# Patient Record
Sex: FEMALE | Race: WHITE
[De-identification: names, ages, dates, MRNs, and addresses within clinical notes are randomized per-mention and may not be internally consistent; named-entity substitution may affect disease eponyms.]

---

## 2021-04-21 ENCOUNTER — HOSPITAL ENCOUNTER (OUTPATIENT)
Dept: HOSPITAL 84 - D.ER | Age: 31
Discharge: HOME | End: 2021-04-21
Attending: OBSTETRICS & GYNECOLOGY
Payer: COMMERCIAL

## 2021-04-21 VITALS
BODY MASS INDEX: 27.37 KG/M2 | HEIGHT: 64 IN | WEIGHT: 160.34 LBS | WEIGHT: 160.34 LBS | BODY MASS INDEX: 27.37 KG/M2 | HEIGHT: 64 IN

## 2021-04-21 VITALS — SYSTOLIC BLOOD PRESSURE: 103 MMHG | DIASTOLIC BLOOD PRESSURE: 61 MMHG

## 2021-04-21 VITALS — SYSTOLIC BLOOD PRESSURE: 96 MMHG | DIASTOLIC BLOOD PRESSURE: 60 MMHG

## 2021-04-21 DIAGNOSIS — O03.4: Primary | ICD-10-CM

## 2021-04-21 LAB
ALBUMIN SERPL-MCNC: 4 G/DL (ref 3.4–5)
ALP SERPL-CCNC: 70 U/L (ref 30–120)
ALT SERPL-CCNC: 18 U/L (ref 10–68)
ANION GAP SERPL CALC-SCNC: 15 MMOL/L (ref 8–16)
BACTERIA #/AREA URNS HPF: (no result) HPF
BASOPHILS NFR BLD AUTO: 0 % (ref 0–2)
BASOPHILS NFR BLD AUTO: 0.1 % (ref 0–2)
BILIRUB SERPL-MCNC: 0.35 MG/DL (ref 0.2–1.3)
BILIRUB SERPL-MCNC: NEGATIVE MG/DL
BUN SERPL-MCNC: 14 MG/DL (ref 7–18)
CALCIUM SERPL-MCNC: 9.4 MG/DL (ref 8.5–10.1)
CHLORIDE SERPL-SCNC: 101 MMOL/L (ref 98–107)
CO2 SERPL-SCNC: 23.6 MMOL/L (ref 21–32)
CREAT SERPL-MCNC: 0.9 MG/DL (ref 0.6–1.3)
EOSINOPHIL NFR BLD: 0 % (ref 0–7)
EOSINOPHIL NFR BLD: 0 % (ref 0–7)
ERYTHROCYTE [DISTWIDTH] IN BLOOD BY AUTOMATED COUNT: 13.6 % (ref 11.5–14.5)
ERYTHROCYTE [DISTWIDTH] IN BLOOD BY AUTOMATED COUNT: 13.7 % (ref 11.5–14.5)
GLOBULIN SER-MCNC: 3.6 G/L
GLUCOSE SERPL-MCNC: 123 MG/DL (ref 74–106)
HCG SERPL-ACNC: 23 MIU/ML
HCG SERPL-ACNC: POSITIVE M[IU]/ML
HCT VFR BLD CALC: 27 % (ref 36–48)
HCT VFR BLD CALC: 29.4 % (ref 36–48)
HGB BLD-MCNC: 8.5 G/DL (ref 12–16)
HGB BLD-MCNC: 9 G/DL (ref 12–16)
IMM GRANULOCYTES NFR BLD: 0 % (ref 0–5)
IMM GRANULOCYTES NFR BLD: 0.1 % (ref 0–5)
KETONES UR STRIP-MCNC: (no result) MG/DL
LYMPHOCYTES # BLD: 0.6 10X3/UL (ref 1.18–3.74)
LYMPHOCYTES # BLD: 1.89 10X3/UL (ref 1.18–3.74)
LYMPHOCYTES NFR BLD AUTO: 13.6 % (ref 15–50)
LYMPHOCYTES NFR BLD AUTO: 9.3 % (ref 15–50)
MCH RBC QN AUTO: 24.6 PG (ref 26–34)
MCH RBC QN AUTO: 25.3 PG (ref 26–34)
MCHC RBC AUTO-ENTMCNC: 30.6 G/DL (ref 31–37)
MCHC RBC AUTO-ENTMCNC: 31.5 G/DL (ref 31–37)
MCV RBC: 80.3 FL (ref 80–100)
MCV RBC: 80.4 FL (ref 80–100)
MONOCYTES NFR BLD: 0.3 % (ref 2–11)
MONOCYTES NFR BLD: 5.5 % (ref 2–11)
NEUTROPHILS # BLD: 11.23 10X3/UL (ref 1.56–6.13)
NEUTROPHILS # BLD: 5.85 10X3/UL (ref 1.56–6.13)
NEUTROPHILS NFR BLD AUTO: 80.7 % (ref 40–80)
NEUTROPHILS NFR BLD AUTO: 90.4 % (ref 40–80)
NITRITE UR-MCNC: NEGATIVE MG/ML
OSMOLALITY SERPL CALC.SUM OF ELEC: 273 MOSM/KG (ref 275–300)
PH UR STRIP: 7 [PH] (ref 5–8)
PLATELET # BLD: 293 10X3/UL (ref 130–400)
PLATELET # BLD: 377 10X3/UL (ref 130–400)
PMV BLD AUTO: 8.5 FL (ref 7.4–10.4)
PMV BLD AUTO: 8.9 FL (ref 7.4–10.4)
POTASSIUM SERPL-SCNC: 3.6 MMOL/L (ref 3.5–5.1)
PROT SERPL-MCNC: 7.6 G/DL (ref 6.4–8.2)
RBC # BLD AUTO: 3.36 10X6/UL (ref 4–5.4)
RBC # BLD AUTO: 3.66 10X6/UL (ref 4–5.4)
SODIUM SERPL-SCNC: 136 MMOL/L (ref 136–145)
SQUAMOUS #/AREA URNS HPF: (no result) HPF (ref 0–4)
UROBILINOGEN UR-MCNC: 1 MG/DL (ref ?–2)
WBC # BLD AUTO: 13.9 10X3/UL (ref 4.8–10.8)
WBC # BLD AUTO: 6.5 10X3/UL (ref 4.8–10.8)
WBC #/AREA URNS HPF: (no result) HPF (ref 0–4)

## 2021-04-21 NOTE — NUR
DISCHARGE INSTRUCTIONS PROVIDED TO PATIENT AT THIS TIME. PT VERBALIZES
UNDERSTANDING AND WILL CALL THE CLINIC TOMORROW TO MAKE A FOLLOW UP APPT FOR
FRIDAY. IV D/C'D WITHOUT COMPLICATIONS.

## 2021-04-21 NOTE — NUR
TRANSFUSION COMPLETED AND FLUSH STARTED.  PT RATES PAIN AT 0/10.  VERIFIED
WITH DR JENKINS ABOUT DIET, DIETARY CALLED FOR REGULAR DIET TRAY.

## 2021-04-21 NOTE — NUR
RECEIVED BY BED FROM RECOVERY ROOM,  PT IS AWAKE AND ALERT, RATES PAIN AT
0/10. VSS AS CHARTED TO FLOWSHEET.  IV TO L FOREARM INFUSING NS AT 125ML/HR
VIA PUMP.  DENIES NAUSEA AND REQUEST DR PEPPER TO DRINK. SIDE RAILS UP X 2
WITH CALL LIGHT IN REACH.

## 2021-04-21 NOTE — NUR
PT DENIES PAIN OR DISCOMFORT. VSS.  TRANSFUSION RATE INCREASED TO 200ML/HR.
CALL LIGHT IN REACH WITH SIDE RAILS UP X 2.

## 2021-04-21 NOTE — OP
PATIENT NAME:  CALEB LITTLE                              MEDICAL RECORD: [id number]
:89                                             LOCATION:[facility]          
                                                         ADMISSION DATE:        
SURGEON:  KIM JENKINS MD        
 
 
DATE OF OPERATION:  2021
 
PREOPERATIVE DIAGNOSIS:   in progress/incomplete .
 
POSTOPERATIVE DIAGNOSIS:   in progress/incomplete .
 
PROCEDURE:  Suction dilation and curettage.
 
ANESTHESIA:  General.
 
IV FLUID:  Per anesthesia record.
 
SPECIMENS:  Included endometrial curettings/products of conception.
 
FINDINGS:
1. Grossly normal-appearing external genitalia and open cervix.
2. Obvious return of products of conception on suction and C.
 
COMPLICATIONS:  None apparent.
 
ESTIMATED BLOOD LOSS:  100 cc.
 
DESCRIPTION OF PROCEDURE:  The patient was taken to the operating room where
general anesthesia was achieved without difficulty.  The patient was then
prepped and draped in normal sterile fashion in the dorsal lithotomy position in
the [facility].  At this point, the patient was prepped and draped and
the bladder was drained of approximately 50 cc of clear yellow urine.  At this
point, a speculum was placed into the vagina and the cervix was grasped on its
anterior lip with a single tooth tenaculum.  The patient was found to be
significantly dilated and the patient was further dilated to approximately 9 mm.
 At this point, an 8 mm straight suction curette was placed into the uterus
without difficulty and visual return of products of conception was noted.  Three
passes were performed and then #1 curette was used to gently feel and for any
further products of conception, gentle curettage was performed with return of
several small pieces of products of conception.  A last pass was performed with
the #8 suction curette with minimal return of blood or products of conception. 
Hemostasis was noted to be well controlled and a single tooth tenaculum was
removed.  No bleeding from the tenaculum site was noted.  Speculum was removed
and the patient was transferred to postanesthesia recovery stable without
incident.
 
TRANSINT:[id number] Voice Confirmation ID: [id number] DOCUMENT ID: [id number]
                                           
                                           KIM JENKINS MD        
CC:                                                             [id number]
DICTATION DATE: 21     :     21 0734      [facility] 
                                                                      21
[id number] [place] [address]

## 2021-04-21 NOTE — NUR
PT TALKING WITH FRIEND AT BEDSIDE. DENIES PAIN OR DISCOMFORT. VSS. LARGE CUP
OF ICE WATER PROVIDED AS REQUESTED. TRANSFUSION CONTINUE TO INFUSE VIA PUMP.
CALL LIGHT IN REACH.

## 2021-04-21 NOTE — NUR
1ST UNIT OF PRBC VERIFIED AT BEDSIDE WITH JERRY AWAD RN. STARTED AT 75ML/HR FOR
INITIAL 50CC,  THIS RN REMAINS AT BEDSIDE.

## 2021-04-21 NOTE — NUR
PT UP TO VOID WITH LITTLE ASSISTANCE NEEDED.  LARGE CUP OF ICE WITH DR MICHAEL
PROVIDED AS REQUESTED.   AT BEDSIDE FOR BLOOD DRAW.

## 2021-04-21 NOTE — NUR
CBC RESULTS REPORTED TO DR JENKINS AT THIS TIME. NEW ORDERS NOTED TO
DISCHARGE PT TO HOME TO FOLLOW UP WITH THE CLINIC ON FRIDAY.  PT MAY TAKE
MOTRIN AS DIRECTED ON THE BOTTLE FOR PAIN.

## 2021-07-01 ENCOUNTER — HOSPITAL ENCOUNTER (OUTPATIENT)
Dept: HOSPITAL 84 - D.LABREF | Age: 31
Discharge: HOME | End: 2021-07-01
Attending: OBSTETRICS & GYNECOLOGY
Payer: COMMERCIAL

## 2021-07-01 VITALS — BODY MASS INDEX: 27.5 KG/M2

## 2021-07-01 DIAGNOSIS — N92.0: Primary | ICD-10-CM

## 2021-07-01 LAB
BASOPHILS NFR BLD AUTO: 0.5 % (ref 0–2)
EOSINOPHIL NFR BLD: 1.3 % (ref 0–7)
ERYTHROCYTE [DISTWIDTH] IN BLOOD BY AUTOMATED COUNT: 18.4 % (ref 11.5–14.5)
HCT VFR BLD CALC: 28.3 % (ref 36–48)
HGB BLD-MCNC: 8.6 G/DL (ref 12–16)
LYMPHOCYTES # BLD: 2.4 10X3/UL (ref 1.18–3.74)
LYMPHOCYTES NFR BLD AUTO: 26.2 % (ref 15–50)
MCH RBC QN AUTO: 19.4 PG (ref 26–34)
MCHC RBC AUTO-ENTMCNC: 30.6 G/DL (ref 31–37)
MCV RBC: 63.4 FL (ref 80–100)
MONOCYTES NFR BLD: 5.8 % (ref 2–11)
NEUTROPHILS # BLD: 6 10X3/UL (ref 1.56–6.13)
NEUTROPHILS NFR BLD AUTO: 66.2 % (ref 40–80)
PLATELET # BLD: 361 10X3/UL (ref 130–400)
PMV BLD AUTO: 7.6 FL (ref 7.4–10.4)
RBC # BLD AUTO: 4.46 10X6/UL (ref 4–5.4)
WBC # BLD AUTO: 9.1 10X3/UL (ref 4.8–10.8)

## 2021-07-02 ENCOUNTER — HOSPITAL ENCOUNTER (OUTPATIENT)
Dept: HOSPITAL 84 - D.ER | Age: 31
Setting detail: OBSERVATION
LOS: 1 days | Discharge: HOME | End: 2021-07-03
Attending: OBSTETRICS & GYNECOLOGY | Admitting: OBSTETRICS & GYNECOLOGY
Payer: COMMERCIAL

## 2021-07-02 VITALS — DIASTOLIC BLOOD PRESSURE: 55 MMHG | SYSTOLIC BLOOD PRESSURE: 93 MMHG

## 2021-07-02 VITALS — DIASTOLIC BLOOD PRESSURE: 55 MMHG | SYSTOLIC BLOOD PRESSURE: 110 MMHG

## 2021-07-02 VITALS
WEIGHT: 148.31 LBS | BODY MASS INDEX: 25.32 KG/M2 | WEIGHT: 148.31 LBS | HEIGHT: 64 IN | BODY MASS INDEX: 25.32 KG/M2 | HEIGHT: 64 IN

## 2021-07-02 VITALS — SYSTOLIC BLOOD PRESSURE: 99 MMHG | DIASTOLIC BLOOD PRESSURE: 58 MMHG

## 2021-07-02 VITALS — SYSTOLIC BLOOD PRESSURE: 96 MMHG | DIASTOLIC BLOOD PRESSURE: 57 MMHG

## 2021-07-02 VITALS — DIASTOLIC BLOOD PRESSURE: 64 MMHG | SYSTOLIC BLOOD PRESSURE: 111 MMHG

## 2021-07-02 VITALS — DIASTOLIC BLOOD PRESSURE: 56 MMHG | SYSTOLIC BLOOD PRESSURE: 103 MMHG

## 2021-07-02 VITALS — SYSTOLIC BLOOD PRESSURE: 95 MMHG | DIASTOLIC BLOOD PRESSURE: 55 MMHG

## 2021-07-02 VITALS — DIASTOLIC BLOOD PRESSURE: 58 MMHG | SYSTOLIC BLOOD PRESSURE: 100 MMHG

## 2021-07-02 DIAGNOSIS — D64.9: ICD-10-CM

## 2021-07-02 DIAGNOSIS — N92.0: Primary | ICD-10-CM

## 2021-07-02 LAB
ANION GAP SERPL CALC-SCNC: 10.8 MMOL/L (ref 8–16)
APTT BLD: 25.1 SECONDS (ref 22.8–39.4)
BASOPHILS NFR BLD AUTO: 0.7 % (ref 0–2)
BUN SERPL-MCNC: 15 MG/DL (ref 7–18)
CALCIUM SERPL-MCNC: 8.6 MG/DL (ref 8.5–10.1)
CHLORIDE SERPL-SCNC: 104 MMOL/L (ref 98–107)
CO2 SERPL-SCNC: 27 MMOL/L (ref 21–32)
CREAT SERPL-MCNC: 0.7 MG/DL (ref 0.6–1.3)
EOSINOPHIL NFR BLD: 2.2 % (ref 0–7)
ERYTHROCYTE [DISTWIDTH] IN BLOOD BY AUTOMATED COUNT: 18.4 % (ref 11.5–14.5)
GLUCOSE SERPL-MCNC: 87 MG/DL (ref 74–106)
HCT VFR BLD CALC: 28.1 % (ref 36–48)
HGB BLD-MCNC: 8.7 G/DL (ref 12–16)
INR PPP: 1.05 (ref 0.85–1.17)
LYMPHOCYTES # BLD: 2.5 10X3/UL (ref 1.18–3.74)
LYMPHOCYTES NFR BLD AUTO: 33 % (ref 15–50)
MCH RBC QN AUTO: 19.4 PG (ref 26–34)
MCHC RBC AUTO-ENTMCNC: 30.7 G/DL (ref 31–37)
MCV RBC: 63.1 FL (ref 80–100)
MONOCYTES NFR BLD: 6 % (ref 2–11)
NEUTROPHILS # BLD: 4.4 10X3/UL (ref 1.56–6.13)
NEUTROPHILS NFR BLD AUTO: 58.1 % (ref 40–80)
OSMOLALITY SERPL CALC.SUM OF ELEC: 275 MOSM/KG (ref 275–300)
PLATELET # BLD: 365 10X3/UL (ref 130–400)
PMV BLD AUTO: 7.2 FL (ref 7.4–10.4)
POTASSIUM SERPL-SCNC: 3.8 MMOL/L (ref 3.5–5.1)
PROTHROMBIN TIME: 12.7 SECONDS (ref 11.6–15)
RBC # BLD AUTO: 4.46 10X6/UL (ref 4–5.4)
SODIUM SERPL-SCNC: 138 MMOL/L (ref 136–145)
WBC # BLD AUTO: 7.5 10X3/UL (ref 4.8–10.8)

## 2021-07-02 NOTE — NUR
LARGE CUP OF ICE WATER AND SMALL CUP ICE AS REQUESTED. EXPLAINED TO PT THAT MD
WOULD NOT BE BACK ON UNIT UNTIL LATER TONIGHT AND IF SHE WISHES TO STAY THAT
IS NOT A PROBLEM OR SHE CAN DISCHARGE HOME, AND WILL HAVE FOLLOW UP AT CLINIC
IN 1-2 WEEKS. TALKING WITH HER FRIEND/FAMILY MEMBER AND WILL NOTIFY NURSE.

## 2021-07-02 NOTE — NUR
1 UNIT OF PRBC'S COMPLETED.  CLAMPED IV TUBING TO BLOOD AND OPENED NS SIDE.
NS INFUSING  ML/HR  ML BOLUS TO CLEAR IV TUBING LINE.  VITALS
TAKEN.  SEE GRAPH.

## 2021-07-02 NOTE — NUR
PT RECEIVED BY WHEELCHAIR FROM ER. TO BATHROOM WITHOUT COMPLAINT OF DIZZINESS
OR NAUSEA.  DR JENKINS PAGED AS REQUESTED.

## 2021-07-02 NOTE — NUR
SURGERY, ANESTHESIA AND BLOOD CONSENTS SIGNED AND WITNESSED.  PT DENIES ANY
ALLERGIES AND STATES "I TAKE BIRTH CONTROL PILL AT NIGHT" RATES PAIN AT 2/10
AT THIS TIME, BUT APPEARS TEARFUL, FRIEND REMAINS AT BEDSIDE.

## 2021-07-02 NOTE — NUR
DR JENKINS CALLED FOR ORDERS.  REPORT THAT PT DENIES PAIN AND NO NAUSEA, SHE
HAS EATEN REGULAR DIET BUT HAS NOT VOIDED.  MAY D/C HOME OR STAY AND SPEAK TO
MD GERALDINE LANDERS. MD ASK THAT HE BE NOTIFIED OF HER DECISION.

## 2021-07-02 NOTE — NUR
PT. MEDICATED WITH NORCO 10/325 MG 1 TAB AND MOTRIN 600 MG 1 TAB FOR
"CRAMPING" THAT PT. RATES AT "2" ON 0-10 SCALE.  NO VAGINAL BLEEDING NOTED AT
THIS TIME.  INSTRUCTED PT. TO NOT GET UP TO BATHROOM WITHOUT MY ASSISTANCE DUE
TO HER BP BEING LOW.  PT. VERBALIZES UNDERSTANDING.  PT. CONTINUES TO DENY ANY
SIGNS OF SYMPTOMS OF BLOOD TRANSFUSION REACTION.  UPON PT. ROLLING AROUND IN
BED TO GET COMFORTABLE, PT.'S PULSE GOES UP TO 68 BUT SLOWS AS SOON AS SHE
GETS STILL.  WILL CONT. TO MONITOR.

## 2021-07-02 NOTE — NUR
PT. CONTINUES TO DENY ANY TYPE OF REACTION TO BLOOD TRANSFUSION.  INCREASED
1ST UNIT PRBC'S  ML/HR.  WILL CONT. TO MONITOR.

## 2021-07-02 NOTE — NUR
DR. JENKINS CALLS UNIT.  REPORT GIVEN OF PT.'S REQUEST OF FOR "SOMETHING TO
HELP HER SLEEP".  ORDERS REC.

## 2021-07-02 NOTE — NUR
PT. CONTINUES TO DENY ANY SIGNS OR SYMPTOMS OF BLOOD TRANSFUSION REACTION.
2ND UNIT INCREASED  ML/HR.  INSTRUCTED PT. TO CALL FOR ANY ISSUES.  CALL
LIGHT WITHIN REACH.  SIDE RAILS UP X 2.  WILL CONT. TO MONITOR.

## 2021-07-02 NOTE — NUR
1ST UNIT OF PRBC'S PICKED UP BY MELYSSA APPLE RN.  TO PT.'S BEDSIDE.  DISCUSSED
SIGNS AND SYMPTOMS OF ALLERGIC REACTION TO BLOOD TRANSFUSION.  MELYSSA APPLE RN AND
LETI AGUILAR RN PERFORM VERIFICATION OF BLOOD PRIOR TO STARTING INFUSION.  ALL ARE
MATCHING.  PT. DENIES ALLERGIES OF ANY KIND.  THIS RN REMAINS AT BEDSIDE FOR
THE FIRST 5 MINUTES OF INFUSION AND PT. DENIES ANY REACTION AT THIS TIME.
CALL LIGHT IN REACH.  INSTRUCTED PT. TO CALL ME IMMEDIATELY IF SHE STARTS
FEELING ANYTHING AND THAT I WILL BE BACK IN 15 MINUTES TO GET ANOTHER SET OF
VITALS.

## 2021-07-02 NOTE — NUR
MELYSSA APPLE RN AND LEIT AGUILAR RN VERIFY 2ND UNIT OF PRBC'S AT PT.'S BEDSIDE.  20 G.
IV CATH IN RIGHT HAND INFUSING WITHOUT DIFFICULTY AND WITH NO PAIN PER PT.

## 2021-07-02 NOTE — NUR
AMBIEN 5 MG (1) TABLET ADMINISTERED PER MD ORDERS AND PT.'S REQUEST FOR
SOMETHING TO HELP HER SLEEP.

## 2021-07-02 NOTE — NUR
RECEIVED TO ROOM BY BED FROM RECOVERY. SHE IS AWAKE AND ALERT, RATES PAIN AT
0/10 STATES "I'M JUST HUNGRY" DR JENKINS SPOKE WITH HER PRIOR TO SURGERY AND
SHE WAS TOLD OK TO EAT WHEN SHE GOT BACK TO HER ROOM, DENIES NAUSEA AT THIS
TIME. VSS.  SHE IS ABLE TO SIT HER SELF UP IN BED, LARGE CUP OF ICE AS
REQUESTED. HER FRIEND HAS HER SOMETHING TO EAT AND DRINK.  CALL LIGHT IN
REACH.

## 2021-07-02 NOTE — NUR
pt. on call light.  upon entering room pt. reports "I USED THE BATHROOM
AGAIN".  300 MLS CLEAR YELLOW URINE EMPTIED FROM NUNS CAP.  PT. DENIES ANY
FURTHER NEEDS AT THIS TIME.  INSTRUCTED PT. THAT SINCE SHE IS RECEIVING BLOOD
I WOULD RATHER HER CALL FOR ASSISTANCE UP TO THE BATHROOM.  PT. VERBALIZES
UNDERSTANDING.  WILL CONT. TO MONITOR.

## 2021-07-03 VITALS — SYSTOLIC BLOOD PRESSURE: 99 MMHG | DIASTOLIC BLOOD PRESSURE: 55 MMHG

## 2021-07-03 VITALS — DIASTOLIC BLOOD PRESSURE: 72 MMHG | SYSTOLIC BLOOD PRESSURE: 105 MMHG

## 2021-07-03 NOTE — NUR
PT. REPORTS "I THINK I'LL GO AHEAD AND GO SINCE YOU'RE IN HERE" WHEN ASKED IF
SHE NEEDS TO USE THE RESTROOM.  20 G. IV IN RIGHT HAND SALINE LOCKED AT THIS
TIME.  PT. UP OUT OF BED QUICKLY.  PT. VOIDED 400 MLS PALE YELLOW URINE INTO
NUNS CAP.  EMPTIED AND REPLACED AT THIS TIME.  INSTRUCTED PT. THAT I WILL NEED
HER TO CONTINUE TO KEEP HER PULSE OX ON.  PT. VERBALIZES UNDERSTANDING.
DENIES ANY QUESTIONS OR CONCERNS AT THIS TIME.  WILL CONT. TO MONITOR.

## 2021-07-03 NOTE — NUR
PATIENT ASSESSMENT COMPLETED AT BEDSIDE. PATIENT DENIES PAIN OR NEEDS AT THIS
TIME. VSS. RIGHT HAND PIV SALINE'D LOCKED. DISCUSSED WITH PATIENT RESTING
HEART RATE. PATIENT DENIES BEING A RUNNER. WHEN PATIENT MOVES, HEART RATE DOES
IMPROVE TO OVER 60BPM. SIDE RAILS UP X2, BED IN LOW POSITION, CALL LIGHT
WITHIN REACH.

## 2021-07-03 NOTE — NUR
PT. AWAKENS EASILY WITH VERBAL STIMULATION.  VITALS TAKEN AND STABLE.  PT.
REPORTS "SOME MILD CRAMPING" AND RATES PAIN AT "4" ON 0-10 SCALE "EVERY NOW
AND THEN".  NORCO 10/325 MG 1 TAB ADMINISTERED ORALLY PER MD ORDERS AND PT.
REPORT OF PAIN.  INQUIRED WITH PT. IF THERE IS ANYTHING I CAN BRING HER AND
PT. REPORTS "NO, IM OK, JUST TIRED".  INSTRUCTED PT. TO CALL IF SHE NEEDS
ANYTHING AND I WILL CHECK BACK LATER BUT THAT IF SHE IS SLEEPING AT THAT TIME
THEN I WILL LEAVE HER BE.  PT. VERBALIZES UNDERSTANDING AND DENIES FURTHER
QUESTIONS OR CONCERNS.  WILL CONT. TO MONITOR.

## 2021-07-03 NOTE — NUR
2ND UNIT OF PRBC'S COMPLETED.  VITALS TAKEN AND STABLE.  INSTRUCTED PT. THAT
SHE STILL NEEDS TO CALL BEFORE GETTING UP AND THAT I NEED TO LEAVE HER PULSE
OX ON FOR NOW.  PT. VERBALIZES UNDERSTANDING.  BLOOD CLAMPED AND NS UNCLAMPED
AND SET  MLS TO FINISH WHAT IS IN IV TUBING.

## 2021-07-03 NOTE — NUR
pt. on call light and reports "i just went to the bathroom".  250 mls clear
yellow urine emptied from nuns cap.  pt. denies pain or needs at this time.
Will cont. to monitor.

## 2021-07-03 NOTE — NUR
PT. RESTING WITH EYES CLOSED IN RIGHT SIDE LYING POSITION.  NO SIGNS OF
DISTRESS NOTED.  RESP. ARE EVEN AND UNLABORED.  WILL CONT. TO MONITOR.  2ND
UNIT OF PRBC'S ALMOST COMPLETE.

## 2021-07-03 NOTE — NUR
PATIENT GIVEN COPY OF DISCHARGE INSTRUCTIONS INCLUDING DISCHARGE EDUCATION.
PATIENT INSTRUCTED TO CALL CLINIC ON MONDAY TO DISCUSS FOLLOW UP APPOINTMENT.
PATIENT VERBALIZED UNDERSTANDING OF INSTRUCTIONS GIVEN. PIV REMOVED USING
ASPETIC TECHNIQUES.  PATIENT TOLERATED WELL. PATIENT DISCHARGED HOME IN STABLE
CONDITION TO SELF CARE.

## 2021-07-04 NOTE — MORECARE
CASE MANAGEMENT DISCHARGE SUMMARY
 
 
PATIENT: CALEB LITTLE                        UNIT: [id number]
ACCOUNT#: [id number]                       ADM DATE: 21
AGE: 31     : 89  SEX: F            [facility]    
AUTHOR: DANIELA,DOC                             PHYSICIAN:                               
 
REFERRING PHYSICIAN: KIM JENKINS MD        
DATE OF SERVICE: 21
Case Management Discharge Planning Summary
 
 
 
 
 
DCP REVIEW SUMMARY
ANTICIPATED D/C DATE: 
EXPECTED LOS : 
CASE STATUS:  DCP Initiated
INITIAL REVIEW:  2021
INITIAL REVIEWER:   Adelia Cabrales
FINAL DISCHARGE DISPOSITION:  : 
FINAL REVIEWER:  
FINAL REVIEW DATE: 
 
  
 
DCP Focus Questions & Answers
 
 
QUESTION: ANSWER
 : 
 
 
 
 
 
PATIENT:  CALEB LITTLE
ENCOUNTER:  [id number]
MEDICAL RECORD#:  [id number]
ADMISSION DATE:  2021
DISCHARGE DATE:  2021
ATTENDING MD:  KIM OMALLEY
:   
AGE:  31
MARITAL STATUS:   S
DC PLAN ID:  [id number]
 
FACILITY:   [facility]
PRINTED ON: 21  16:32  CT
 
 
 
 
 
 
 
 
**All edits/amendments must be made on the electronic document**
 
DICTATION DATE: 21 163     : DM  21 1632     
RPT#: [id number]                                DC DATE:21
                                               STATUS: DIS IN  
[facility]
[address] [place] [address]
***END OF REPORT***